# Patient Record
Sex: FEMALE | Race: WHITE | URBAN - METROPOLITAN AREA
[De-identification: names, ages, dates, MRNs, and addresses within clinical notes are randomized per-mention and may not be internally consistent; named-entity substitution may affect disease eponyms.]

---

## 2021-09-21 ENCOUNTER — OFFICE VISIT (OUTPATIENT)
Dept: URBAN - METROPOLITAN AREA CLINIC 64 | Facility: CLINIC | Age: 41
End: 2021-09-21
Payer: MEDICARE

## 2021-09-21 DIAGNOSIS — H02.821 CYSTS OF RIGHT UPPER EYELID: Primary | ICD-10-CM

## 2021-09-21 PROCEDURE — 99202 OFFICE O/P NEW SF 15 MIN: CPT | Performed by: STUDENT IN AN ORGANIZED HEALTH CARE EDUCATION/TRAINING PROGRAM

## 2021-09-21 NOTE — IMPRESSION/PLAN
Impression: Cysts of right upper eyelid: H02.821. Plan: Extensive PMH Arnold Chiari, SPina Bifida, s/p craniotomy, wheelchair bound. Patient and her mother (over the phone) discussed procedure and Radu Rodriguez has decided to refrain from the procedure today. 

Follow up with general optometry 6 months DFE

## 2021-11-02 ENCOUNTER — OFFICE VISIT (OUTPATIENT)
Dept: URBAN - METROPOLITAN AREA CLINIC 64 | Facility: CLINIC | Age: 41
End: 2021-11-02
Payer: MEDICARE

## 2021-11-02 PROCEDURE — 99212 OFFICE O/P EST SF 10 MIN: CPT | Performed by: STUDENT IN AN ORGANIZED HEALTH CARE EDUCATION/TRAINING PROGRAM

## 2021-11-02 NOTE — IMPRESSION/PLAN
Impression: Cysts of right upper eyelid: H02.821. Plan: Extensive PMH Arnold Chiari, SPina Bifida, s/p craniotomy, wheelchair bound. Pt states the cyst was inflamed and had pus coming out of it a few days ago but it has since stopped. Discussed there is no medical need to remove the cyst at this time. Patient and her mother (over the phone) discussed procedure and Rahel Linares has decided to refrain from the procedure today. Advised patient to RTC if inflammation and drainage happens again while it is happening. Follow up with general optometry 4 months DFE.

## 2022-03-21 ENCOUNTER — OFFICE VISIT (OUTPATIENT)
Dept: URBAN - METROPOLITAN AREA CLINIC 64 | Facility: CLINIC | Age: 42
End: 2022-03-21
Payer: MEDICARE

## 2022-03-21 DIAGNOSIS — H53.8 OTHER VISUAL DISTURBANCES: ICD-10-CM

## 2022-03-21 PROCEDURE — 92004 COMPRE OPH EXAM NEW PT 1/>: CPT | Performed by: OPTOMETRIST

## 2022-03-21 ASSESSMENT — VISUAL ACUITY
OS: 20/20
OD: 20/20

## 2022-03-21 ASSESSMENT — INTRAOCULAR PRESSURE
OD: 14
OS: 11

## 2022-03-21 NOTE — IMPRESSION/PLAN
Impression: Other visual disturbances: H53.8. Plan: Blurry vision is from uncorrected hyperopia. She uses Rx glasses to read but feels they don't work as well as they should. Discussed Rx glasses today. She declines. Ok to try OTC +1.50 or +1.75 for reading. Ocular health is normal on dilated exam today.